# Patient Record
Sex: FEMALE | Race: WHITE | NOT HISPANIC OR LATINO | URBAN - METROPOLITAN AREA
[De-identification: names, ages, dates, MRNs, and addresses within clinical notes are randomized per-mention and may not be internally consistent; named-entity substitution may affect disease eponyms.]

---

## 2022-08-04 ENCOUNTER — OFFICE VISIT (OUTPATIENT)
Dept: URGENT CARE | Facility: PHYSICIAN GROUP | Age: 67
End: 2022-08-04
Payer: COMMERCIAL

## 2022-08-04 VITALS
BODY MASS INDEX: 45.82 KG/M2 | OXYGEN SATURATION: 95 % | WEIGHT: 275 LBS | HEART RATE: 76 BPM | SYSTOLIC BLOOD PRESSURE: 138 MMHG | DIASTOLIC BLOOD PRESSURE: 82 MMHG | HEIGHT: 65 IN | TEMPERATURE: 97.6 F | RESPIRATION RATE: 18 BRPM

## 2022-08-04 DIAGNOSIS — M79.661 PAIN OF RIGHT CALF: ICD-10-CM

## 2022-08-04 PROCEDURE — 99204 OFFICE O/P NEW MOD 45 MIN: CPT

## 2022-08-04 RX ORDER — ATORVASTATIN CALCIUM 20 MG/1
TABLET, FILM COATED ORAL
COMMUNITY

## 2022-08-04 RX ORDER — SERTRALINE HYDROCHLORIDE 100 MG/1
100 TABLET, FILM COATED ORAL DAILY
COMMUNITY

## 2022-08-04 RX ORDER — PANTOPRAZOLE SODIUM 40 MG/1
TABLET, DELAYED RELEASE ORAL
COMMUNITY
Start: 2022-07-25

## 2022-08-04 RX ORDER — ANASTROZOLE 1 MG/1
TABLET ORAL
COMMUNITY

## 2022-08-04 RX ORDER — HYDROXYCHLOROQUINE SULFATE 200 MG/1
TABLET, FILM COATED ORAL
COMMUNITY
Start: 2022-03-10

## 2022-08-04 RX ORDER — VALSARTAN AND HYDROCHLOROTHIAZIDE 320; 12.5 MG/1; MG/1
1 TABLET, FILM COATED ORAL DAILY
COMMUNITY
Start: 2022-03-07 | End: 2023-03-07

## 2022-08-04 RX ORDER — ALPRAZOLAM 0.25 MG/1
1 TABLET ORAL EVERY 8 HOURS
COMMUNITY

## 2022-08-04 ASSESSMENT — ENCOUNTER SYMPTOMS
GASTROINTESTINAL NEGATIVE: 1
EYES NEGATIVE: 1
CONSTITUTIONAL NEGATIVE: 1
RESPIRATORY NEGATIVE: 1
CARDIOVASCULAR NEGATIVE: 1

## 2022-08-05 NOTE — PROGRESS NOTES
Subjective     Kathleen Russell is a 66 y.o. female who presents with Leg Pain (X2days legs feel throbbing pain. Pulsating pain that comes and goes. )            HPI     Patient presents with right leg pain that started yesterday.  She reports pain in her right calf which she describes as achy achy, sharp, and throbbing.  Pain is 9 out of 10 at the worst, currently with mild to moderate pain.  Patient reports pain occasionally radiates to the right thigh.  Pain is intermittent, worse with walking and weightbearing, better with wearing compression stockings and resting.  She took some ibuprofen which provided some relief.  Patient also reports noticing some mild swelling on the right leg as compared to the left leg.  She reports she has been elevating her legs provided some relief.  Patient reports trying from [place] for 3 hours with no stop.  She does report she has been driving back and forth from [place] to [place], and renal to [place] recently.  Patient reports that she was diagnosed with a circulation issue 12 years ago and was supposed to have undergone vein surgery, but this was not completed due to financial reasons.    Patient's current problem list, medications, and past medical/surgical history were reviewed in Epic.    PMH:  has no past medical history on file.  MEDS:   Current Outpatient Medications:   •  Fluticasone Furoate-Vilanterol (BREO) 100-25 MCG/INH AEROSOL POWDER, BREATH ACTIVATED, Breo Ellipta 100 mcg-25 mcg/dose powder for inhalation, Disp: , Rfl:   •  calcium citrate 315 mg-vitamin D 200 mg (CITRACAL+D) 315-200 MG-UNIT per tablet, Take 1 Tablet by mouth 2 times a day., Disp: , Rfl:   •  atorvastatin (LIPITOR) 20 MG Tab, atorvastatin 20 mg tablet  TAKE 1 TABLET BY MOUTH EVERY DAY, Disp: , Rfl:   •  anastrozole (ARIMIDEX) 1 MG Tab, anastrozole 1 mg tablet, Disp: , Rfl:   •  ALPRAZolam (XANAX) 0.25 MG Tab, Take 1 Tablet by mouth every 8 hours., Disp: , Rfl:   •  hydroxychloroquine  "(PLAQUENIL) 200 MG Tab, hydroxychloroquine 200 mg tablet  TAKE 1 TABLET BY MOUTH EVERY DAY, Disp: , Rfl:   •  pantoprazole (PROTONIX) 40 MG Tablet Delayed Response, pantoprazole 40 mg tablet,delayed release, Disp: , Rfl:   •  valsartan-hydrochlorothiazide (DIOVAN-HCT) 320-12.5 MG per tablet, Take 1 Tablet by mouth every day., Disp: , Rfl:   •  sertraline (ZOLOFT) 100 MG Tab, Take 100 mg by mouth every day., Disp: , Rfl:   ALLERGIES: No Known Allergies  SURGHX: No past surgical history on file.  SOCHX:    FH: Reviewed with patient, not pertinent to this visit.       Review of Systems   Constitutional: Negative.    HENT: Negative.    Eyes: Negative.    Respiratory: Negative.    Cardiovascular: Negative.    Gastrointestinal: Negative.    Genitourinary: Negative.    Musculoskeletal:        Leg pain   Skin: Negative.               Objective     /82   Pulse 76   Temp 36.4 °C (97.6 °F) (Tympanic)   Resp 18   Ht 1.651 m (5' 5\")   Wt 125 kg (275 lb)   SpO2 95%   BMI 45.76 kg/m²      Physical Exam  Constitutional:       Appearance: Normal appearance.   HENT:      Head: Normocephalic.      Nose: Nose normal.   Eyes:      Extraocular Movements: Extraocular movements intact.   Cardiovascular:      Rate and Rhythm: Normal rate and regular rhythm.      Pulses: Normal pulses.      Heart sounds: Normal heart sounds.   Pulmonary:      Effort: Pulmonary effort is normal.      Breath sounds: Normal breath sounds.   Musculoskeletal:         General: Normal range of motion.      Cervical back: Normal range of motion.      Right lower leg: Tenderness present. No swelling.      Left lower leg: No swelling or tenderness. No edema.        Legs:    Skin:     General: Skin is warm and dry.   Neurological:      General: No focal deficit present.      Mental Status: She is alert.   Psychiatric:         Mood and Affect: Mood normal.         Behavior: Behavior normal.         Judgment: Judgment normal.            Assessment & Plan "       1. Pain of right calf    - US-EXTREMITY VENOUS LOWER UNILAT RIGHT; Future       Discussed differentials with patient including but not limited to DVT, varicose vein, musculoskeletal strain, among others.  Ultrasound of the right lower extremity is ordered, scheduled for tomorrow.  May apply cold compress/ice pack to the area for pain and swelling relief.  May take ibuprofen up to 800 mg as needed for pain and swelling. Discussed treatment plan with patient, she is agreeable and verbalized understanding.  Educated patient on signs and symptoms watch out for, when to return to the clinic or go to the ER.    Electronically Signed by LUBA Brizuela